# Patient Record
Sex: FEMALE | URBAN - METROPOLITAN AREA
[De-identification: names, ages, dates, MRNs, and addresses within clinical notes are randomized per-mention and may not be internally consistent; named-entity substitution may affect disease eponyms.]

---

## 2019-04-11 ENCOUNTER — SURGERY - HEALTHEAST (OUTPATIENT)
Dept: SURGERY | Facility: HOSPITAL | Age: 24
End: 2019-04-11

## 2019-04-11 ENCOUNTER — ANESTHESIA - HEALTHEAST (OUTPATIENT)
Dept: SURGERY | Facility: HOSPITAL | Age: 24
End: 2019-04-11

## 2019-04-11 ENCOUNTER — RECORDS - HEALTHEAST (OUTPATIENT)
Dept: ADMINISTRATIVE | Facility: OTHER | Age: 24
End: 2019-04-11

## 2019-04-11 ENCOUNTER — COMMUNICATION - HEALTHEAST (OUTPATIENT)
Dept: SURGERY | Facility: CLINIC | Age: 24
End: 2019-04-11

## 2019-04-11 ASSESSMENT — MIFFLIN-ST. JEOR: SCORE: 1277.06

## 2019-04-12 ASSESSMENT — MIFFLIN-ST. JEOR: SCORE: 1293.84

## 2021-05-27 NOTE — TELEPHONE ENCOUNTER
Advised Kenny of the conversation that I had with Avis. She advise that they certainly do not have a  or financial office who helps with MA applications or emergency aid.  (as suspected)   I explained the conversation between myself and the patient (and ) as noted below. Advised Kenny that the pt confirmed understanding of the out of pocket cost at the time of surgery due to lack of insurance.     Kenny confirmed the information and said she would let me know if  The patient came and the result of the situation.  Advised that if the pt was not having surgery today due to the insurance problems to let us know and we can help set up her up for surgery at the hospital instead.  Kenny agreed with the plan.

## 2021-05-27 NOTE — ANESTHESIA PREPROCEDURE EVALUATION
Anesthesia Evaluation      Patient summary reviewed   No history of anesthetic complications     Airway   Mallampati: II  Neck ROM: full   Pulmonary - negative ROS and normal exam                          Cardiovascular - negative ROS and normal exam  Exercise tolerance: > or = 4 METS  ECG reviewed        Neuro/Psych - negative ROS     Endo/Other - negative ROS      GI/Hepatic/Renal - negative ROS      Other findings: Labs 4/11/19:  WBC 6.4, Hgb 13.4, Plt 216  Na 140, K 3.6, BUN 6, Cr 0.80      Dental - normal exam                        Anesthesia Plan  Planned anesthetic: general endotracheal  GETA.  High risk for PONV and plan for scopolamine patch, dexamethasone, zofran and low-dose propofol gtt (25-50 mcg/kg/min).    ASA 1   Induction: intravenous   Anesthetic plan and risks discussed with: patient and sibling  Anesthesia plan special considerations: antiemetics,   Post-op plan: routine recovery

## 2021-05-27 NOTE — TELEPHONE ENCOUNTER
Contacted [facility] this morning in response to VM left by  Wednesday night (4/10/19) requesting to try and get this pt on for [facility] either Thursday (4/11/19) or Friday (4/12/19) at [facility] or [facility].  Recent ED trip.  LMFCB with Kenny @ [facility] since the Surgeon is already scheduled there today and tomorrow looking for room to add on.     Attaching Clemencia Marquis to message for follow up upon her return if necessary.

## 2021-05-27 NOTE — TELEPHONE ENCOUNTER
"Called Avis back via  services (ID [id number]) regarding her insurance. It was noted by Kenny at [facility] that the pt did not have insurance posted in their chart, I confirmed that there was no record of insurance that I saw as well.     Reached out to pt to receive insurance information,  Pt stated:  \" Some one called me about 20 mins ago from the financial office, they are going to help me apply for emergency medical assistance\"    I asked the pt numerous times if the person was from [facility] or [facility] and did she get a name of the woman. She said she did not get her name but she was with [facility] because she (the pt) is going to arrive early so they can help her fill out the application for emergency medical assistance.    This information did not seem correct to me, but the pt was very addiment that it was [facility] who she spoke with. I advised that if she comes to have surgery today with out insurance she will need to pay the full price out of pocket before the surgery will begin. (this information was restated to her more than once in the conversation)  Pt claims she fully understands that but is going to be arriving to [facility] early to work on the MA application.    "

## 2021-05-27 NOTE — TELEPHONE ENCOUNTER
MARC and I were able to get the pt on for surgery at [facility] for this afternoon.   Using an  information was relayed to pt:  Arrival @ 12:45pm  NPO starting now  Provided address for [facility]  Advised to have a .    Pt understood the information asked about discharge same day or next day - advised using  it will be same day discharge.

## 2021-05-27 NOTE — ANESTHESIA POSTPROCEDURE EVALUATION
Patient: Avis Mendoza  CHOLECYSTECTOMY, LAPAROSCOPIC  Anesthesia type: general    Patient location: PACU  Last vitals:   Vitals Value Taken Time   /77 4/11/2019  7:00 PM   Temp 36.7  C (98  F) 4/11/2019  6:35 PM   Pulse 105 4/11/2019  7:03 PM   Resp 16 4/11/2019  7:03 PM   SpO2 96 % 4/11/2019  7:03 PM   Vitals shown include unvalidated device data.  Post vital signs: stable  Level of consciousness: awake and responds to simple questions  Post-anesthesia pain: pain controlled  Post-anesthesia nausea and vomiting: no  Pulmonary: unassisted, return to baseline  Cardiovascular: stable and blood pressure at baseline  Hydration: adequate  Anesthetic events: no    QCDR Measures:  ASA# 11 - Ashely-op Cardiac Arrest: ASA11B - Patient did NOT experience unanticipated cardiac arrest  ASA# 12 - Ashely-op Mortality Rate: ASA12B - Patient did NOT die  ASA# 13 - PACU Re-Intubation Rate: ASA13B - Patient did NOT require a new airway mgmt  ASA# 10 - Composite Anes Safety: ASA10A - No serious adverse event    Additional Notes:

## 2021-05-27 NOTE — ANESTHESIA CARE TRANSFER NOTE
Last vitals:   Vitals:    04/11/19 1835   BP: 126/68   Pulse: (!) 109   Resp: 16   Temp: 36.7  C (98  F)   SpO2: 99%     Patient's level of consciousness is drowsy  Spontaneous respirations: yes  Maintains airway independently: yes  Dentition unchanged: yes  Oropharynx: oropharynx clear of all foreign objects    QCDR Measures:  ASA# 20 - Surgical Safety Checklist: WHO surgical safety checklist completed prior to induction    PQRS# 430 - Adult PONV Prevention: 4558F - Pt received => 2 anti-emetic agents (different classes) preop & intraop  ASA# 8 - Peds PONV Prevention: NA - Not pediatric patient, not GA or 2 or more risk factors NOT present  PQRS# 424 - Ashely-op Temp Management: 4559F - At least one body temp DOCUMENTED => 35.5C or 95.9F within required timeframe  PQRS# 426 - PACU Transfer Protocol: - Transfer of care checklist used  ASA# 14 - Acute Post-op Pain: ASA14B - Patient did NOT experience pain >= 7 out of 10

## 2021-06-02 VITALS — HEIGHT: 61 IN | WEIGHT: 133.7 LBS | BODY MASS INDEX: 25.24 KG/M2

## 2021-06-16 PROBLEM — K81.9 CHOLECYSTITIS: Status: ACTIVE | Noted: 2019-04-11
